# Patient Record
Sex: MALE | Race: BLACK OR AFRICAN AMERICAN | NOT HISPANIC OR LATINO | Employment: FULL TIME | URBAN - METROPOLITAN AREA
[De-identification: names, ages, dates, MRNs, and addresses within clinical notes are randomized per-mention and may not be internally consistent; named-entity substitution may affect disease eponyms.]

---

## 2018-08-09 ENCOUNTER — HOSPITAL ENCOUNTER (EMERGENCY)
Facility: HOSPITAL | Age: 53
Discharge: HOME | End: 2018-08-09
Attending: EMERGENCY MEDICINE | Admitting: EMERGENCY MEDICINE
Payer: MEDICAID

## 2018-08-09 VITALS
TEMPERATURE: 97.7 F | OXYGEN SATURATION: 98 % | WEIGHT: 210 LBS | RESPIRATION RATE: 19 BRPM | SYSTOLIC BLOOD PRESSURE: 163 MMHG | HEART RATE: 66 BPM | BODY MASS INDEX: 25.57 KG/M2 | HEIGHT: 76 IN | DIASTOLIC BLOOD PRESSURE: 110 MMHG

## 2018-08-09 DIAGNOSIS — Z20.2 STD EXPOSURE: Primary | ICD-10-CM

## 2018-08-09 LAB
BACTERIA UR QL AUTO: ABNORMAL /HPF
BILIRUB UR QL STRIP: NEGATIVE
CLARITY UR: CLEAR
CLARITY, POC: CLEAR
COLOR UR: YELLOW
COLOR, POC: YELLOW
GLUCOSE UR STRIP-MCNC: NEGATIVE MG/DL
HGB UR QL STRIP.AUTO: ABNORMAL
HIV 1+2 AB+HIV1 P24 AG SERPL QL IA: NORMAL
HIV1 P24 AG SER QL: NORMAL
HYALINE CASTS #/AREA URNS LPF: ABNORMAL /LPF
KETONES UR STRIP-MCNC: NEGATIVE MG/DL
LEUKOCYTE ESTERASE UR QL STRIP: ABNORMAL
NITRITE UR QL STRIP: NEGATIVE
NON-SQ EPI CELLS URNS QL MICRO: ABNORMAL /HPF
PH UR STRIP.AUTO: 6.5 [PH] (ref 4.5–8)
PROT UR STRIP-MCNC: NEGATIVE MG/DL
RBC #/AREA URNS AUTO: ABNORMAL /HPF
SP GR UR STRIP.AUTO: >=1.03 (ref 1–1.03)
UROBILINOGEN UR QL STRIP.AUTO: 0.2 E.U./DL
WBC #/AREA URNS AUTO: ABNORMAL /HPF

## 2018-08-09 PROCEDURE — 96372 THER/PROPH/DIAG INJ SC/IM: CPT

## 2018-08-09 PROCEDURE — 87591 N.GONORRHOEAE DNA AMP PROB: CPT | Performed by: EMERGENCY MEDICINE

## 2018-08-09 PROCEDURE — 99283 EMERGENCY DEPT VISIT LOW MDM: CPT

## 2018-08-09 PROCEDURE — 87806 HIV AG W/HIV1&2 ANTB W/OPTIC: CPT | Performed by: EMERGENCY MEDICINE

## 2018-08-09 PROCEDURE — 87086 URINE CULTURE/COLONY COUNT: CPT

## 2018-08-09 PROCEDURE — 81001 URINALYSIS AUTO W/SCOPE: CPT

## 2018-08-09 PROCEDURE — 36415 COLL VENOUS BLD VENIPUNCTURE: CPT | Performed by: EMERGENCY MEDICINE

## 2018-08-09 PROCEDURE — 87491 CHLMYD TRACH DNA AMP PROBE: CPT | Performed by: EMERGENCY MEDICINE

## 2018-08-09 RX ORDER — AZITHROMYCIN 250 MG/1
1000 TABLET, FILM COATED ORAL ONCE
Status: COMPLETED | OUTPATIENT
Start: 2018-08-09 | End: 2018-08-09

## 2018-08-09 RX ADMIN — LIDOCAINE HYDROCHLORIDE 250 MG: 10 INJECTION, SOLUTION EPIDURAL; INFILTRATION; INTRACAUDAL; PERINEURAL at 05:08

## 2018-08-09 RX ADMIN — AZITHROMYCIN MONOHYDRATE 1000 MG: 250 TABLET ORAL at 05:07

## 2018-08-09 NOTE — ED ATTENDING ATTESTATION
Cecilia Mclaughlin MD, saw and evaluated the patient  I have discussed the patient with the resident/non-physician practitioner and agree with the resident's/non-physician practitioner's findings, Plan of Care, and MDM as documented in the resident's/non-physician practitioner's note, except where noted  All available labs and Radiology studies were reviewed  At this point I agree with the current assessment done in the Emergency Department  I have conducted an independent evaluation of this patient including a focused history of:    Emergency Department Note- Umberto Gr 45 y o  male MRN: [id number]    Unit/Bed#: ED 12 Encounter: [id number]    Umberto Gr is a 45 y o  male who presents with   Chief Complaint   Patient presents with    Exposure to STD     Pt  reports "I was engaging in sexual intercourse, and the condom broke, and I think she gave me Ladneha Toyers  I also want to get tested for HIV "           History of Present Illness   HPI:  Umberto Gr is a 45 y o  male who presents for evaluation of:  STD exposure after sexual intercourse  Patient is requesting STD and HIV screening  The patient believes that he may have contracted chlamydia from the sexual intercourse  Review of Systems   Constitutional: Negative for fatigue and fever  Genitourinary: Negative for discharge and penile swelling  All other systems reviewed and are negative  Historical Information   Past Medical History:   Diagnosis Date    Asthma     Hypertension      History reviewed  No pertinent surgical history    Social History   History   Alcohol Use No     History   Drug Use No     History   Smoking Status    Current Every Day Smoker    Packs/day: 1 00    Types: Cigarettes   Smokeless Tobacco    Never Used     Family History: non-contributory    Meds/Allergies   all medications and allergies reviewed  No Known Allergies    Objective   First Vitals:   Blood Pressure: (!) 163/110 (08/09/18 0418)  Pulse: 66 (18)  Temperature: 97 7 °F (36 5 °C) (18)  Temp Source: Oral (18)  Respirations: 19 (18)  Height: 6' 4" (193 cm) (18)  Weight - Scale: 95 3 kg (210 lb) (18)  SpO2: 98 % (18)    Current Vitals:   Blood Pressure: (!) 163/110 (18)  Pulse: 66 (18)  Temperature: 97 7 °F (36 5 °C) (18)  Temp Source: Oral (18)  Respirations: 19 (18)  Height: 6' 4" (193 cm) (18)  Weight - Scale: 95 3 kg (210 lb) (18)  SpO2: 98 % (18)    No intake or output data in the 24 hours ending 18 1500    Invasive Devices          No matching active lines, drains, or airways          Physical Exam   Constitutional: He is oriented to person, place, and time  He appears well-developed and well-nourished  HENT:   Head: Normocephalic and atraumatic  Genitourinary: Penis normal  No penile tenderness  Musculoskeletal: Normal range of motion  He exhibits no deformity  Neurological: He is alert and oriented to person, place, and time  Skin: Skin is warm and dry  Psychiatric: He has a normal mood and affect  His behavior is normal  Judgment and thought content normal    Nursing note and vitals reviewed  Medical Decision Makin  STD exposure:  Plan to screen the patient for chlamydia, gonorrhea, and HIV      Recent Results (from the past 36 hour(s))   POCT urinalysis dipstick    Collection Time: 18  5:02 AM   Result Value Ref Range    Color, UA yellow     Clarity, UA clear    Rapid HIV 1/2 AB-AG Combo    Collection Time: 18  5:09 AM   Result Value Ref Range    Rapid HIV 1 AND 2 Non-Reactive Non-Reactive    HIV-1 P24 Ag Screen Non-Reactive Non-Reactive   ED Urine Macroscopic    Collection Time: 18  5:13 AM   Result Value Ref Range    Color, UA Yellow     Clarity, UA Clear     pH, UA 6 5 4 5 - 8 0    Leukocytes, UA Small (A) Negative    Nitrite, UA Negative Negative    Protein, UA Negative Negative mg/dl    Glucose, UA Negative Negative mg/dl    Ketones, UA Negative Negative mg/dl    Urobilinogen, UA 0 2 0 2, 1 0 E U /dl E U /dl    Bilirubin, UA Negative Negative    Blood, UA Small (A) Negative    Specific Gravity, UA >=1 030 1 003 - 1 030   Urine Microscopic    Collection Time: 08/09/18  5:13 AM   Result Value Ref Range    RBC, UA 4-10 (A) None Seen, 0-5 /hpf    WBC, UA 30-50 (A) None Seen, 0-5, 5-55, 5-65 /hpf    Epithelial Cells None Seen None Seen, Occasional /hpf    Bacteria, UA None Seen None Seen, Occasional /hpf    Hyaline Casts, UA 3-5 (A) None Seen /lpf     No orders to display         Portions of the record may have been created with voice recognition software  Occasional wrong word or "sound a like" substitutions may have occurred due to the inherent limitations of voice recognition software  Read the chart carefully and recognize, using context, where substitutions have occurred

## 2018-08-09 NOTE — DISCHARGE INSTRUCTIONS
Sexually Transmitted Diseases   WHAT YOU NEED TO KNOW:   A sexually transmitted disease (STD), is also called a sexually transmitted infection (STI)  An STD is an infection caused by bacteria or a virus  STDs are spread by oral, genital, or anal sex  Some examples of STDs are HIV, chlamydia, syphilis, and gonorrhea  DISCHARGE INSTRUCTIONS:   Return to the emergency department if:   · You have genital swelling or pain, or unusual bleeding  · You have joint pain, rash, swollen lymph nodes, or night sweats  · You have severe abdominal pain  Contact your healthcare provider if:   · You have a fever  · Your symptoms do not go away or they get worse, even after treatment  · You have bleeding or pain during sex  · You have questions or concerns about your condition or care  Medicines:   · Medicines  help treat the infection  · Take your medicine as directed  Contact your healthcare provider if you think your medicine is not helping or if you have side effects  Tell him of her if you are allergic to any medicine  Keep a list of the medicines, vitamins, and herbs you take  Include the amounts, and when and why you take them  Bring the list or the pill bottles to follow-up visits  Carry your medicine list with you in case of an emergency  Prevent the spread of an STD:  Ask your healthcare provider for more information about the following safe sex practices:  · Use condoms  Use a latex condom if you have oral, genital, or anal sex  Use a new condom each time  Use a polyurethane condom if you are allergic to latex  · Do not douche  Douching upsets the normal balance of bacteria are found in your vagina  It does not prevent or clear up vaginal infections  · Do not have sex with someone who has an STD  This includes oral and anal sex  · Limit sexual partners  Have sex with one person who is not having sex with anyone else  · Do not have sex during treatment    Do not have sex while you or your partners are being treated for an STD  · Get screening tests regularly  if you are sexually active  · Get vaccinated  Vaccines may help to prevent your risk of some STDs  Ask your healthcare provider for more information about vaccines for STDs  Follow up with your healthcare provider as directed:  Write down your questions so you remember to ask them during your visits  © 2017 [employer] Information is for End User's use only and may not be sold, redistributed or otherwise used for commercial purposes  All illustrations and images included in CareNotes® are the copyrighted property of [employer]  or [employer]  The above information is an  only  It is not intended as medical advice for individual conditions or treatments  Talk to your doctor, nurse or pharmacist before following any medical regimen to see if it is safe and effective for you

## 2018-08-09 NOTE — ED PROVIDER NOTES
History  Chief Complaint   Patient presents with    Exposure to STD     Pt  reports "I was engaging in sexual intercourse, and the condom broke, and I think she gave me Cloteal Raw  I also want to get tested for HIV "         29-year-old male presents to the emergency room for penile discharge and dysuria x1 day  Patient states that he had sexual intercourse with a women that he does not know very well 2 days ago  States that the condom broke and that today he developed white penile discharge and intermittent dysuria  He denies any rashes or penile lesions, testicular pain/ swelling, abdominal pain, nausea / vomiting, fevers/chills, chest pain, or shortness of breath  He states that he has history of Chlamydia the past and states that symptoms are similar  He also states that he would like to be tested for HIV as well  History provided by:  Patient  Exposure to STD   Location:  Sexual intercourse   Quality:  Penile discharge  Severity:  Moderate  Onset quality:  Sudden  Duration:  1 day  Timing:  Constant  Progression:  Unchanged  Chronicity:  New  Context:  Sex with a women he doesnt know well and condom broke   Relieved by:  None tried   Worsened by:  Nothing   Ineffective treatments:  None tried   Associated symptoms: no abdominal pain, no chest pain, no congestion, no cough, no diarrhea, no ear pain, no fever, no headaches, no nausea, no rash, no shortness of breath, no sore throat, no vomiting and no wheezing    Risk factors:  Risky sexual behavior       None       Past Medical History:   Diagnosis Date    Asthma     Hypertension        History reviewed  No pertinent surgical history  History reviewed  No pertinent family history  I have reviewed and agree with the history as documented      Social History   Substance Use Topics    Smoking status: Current Every Day Smoker     Packs/day: 1 00     Types: Cigarettes    Smokeless tobacco: Never Used    Alcohol use No        Review of Systems Constitutional: Negative for chills and fever  HENT: Negative for congestion, ear pain and sore throat  Eyes: Negative for pain and visual disturbance  Respiratory: Negative for cough, shortness of breath and wheezing  Cardiovascular: Negative for chest pain and leg swelling  Gastrointestinal: Negative for abdominal pain, diarrhea, nausea and vomiting  Genitourinary: Positive for discharge and dysuria  Negative for frequency, hematuria, penile pain, penile swelling, scrotal swelling, testicular pain and urgency  Musculoskeletal: Negative for neck pain and neck stiffness  Skin: Negative for rash and wound  Neurological: Negative for weakness, numbness and headaches  Psychiatric/Behavioral: Negative for agitation and confusion  All other systems reviewed and are negative  Physical Exam  ED Triage Vitals [08/09/18 0418]   Temperature Pulse Respirations Blood Pressure SpO2   97 7 °F (36 5 °C) 66 19 (!) 163/110 98 %      Temp Source Heart Rate Source Patient Position - Orthostatic VS BP Location FiO2 (%)   Oral Monitor Sitting Left arm --      Pain Score       1           Orthostatic Vital Signs  Vitals:    08/09/18 0418   BP: (!) 163/110   Pulse: 66   Patient Position - Orthostatic VS: Sitting       Physical Exam   Constitutional: He is oriented to person, place, and time  He appears well-developed and well-nourished  HENT:   Head: Normocephalic and atraumatic  Mouth/Throat: Oropharynx is clear and moist    Eyes: EOM are normal  Pupils are equal, round, and reactive to light  Neck: Normal range of motion  Neck supple  Cardiovascular: Normal rate and regular rhythm  Pulmonary/Chest: Effort normal and breath sounds normal  No respiratory distress  He has no wheezes  He has no rales  Abdominal: Soft  Bowel sounds are normal  He exhibits no distension  There is no tenderness  Genitourinary: Testes normal and penis normal  Circumcised  No discharge found     Musculoskeletal: Normal range of motion  Neurological: He is alert and oriented to person, place, and time  No focal deficits   Skin: Skin is warm and dry  Nursing note and vitals reviewed  ED Medications  Medications   cefTRIAXone (ROCEPHIN) 250 mg in lidocaine (PF) (XYLOCAINE-MPF) 1 % IM only syringe (250 mg Intramuscular Given 8/9/18 0508)   azithromycin (ZITHROMAX) tablet 1,000 mg (1,000 mg Oral Given 8/9/18 0507)       Diagnostic Studies  Results Reviewed     Procedure Component Value Units Date/Time    Rapid HIV 1/2 AB-AG Combo [[id number]]  (Normal) Collected:  08/09/18 0509    Lab Status:  Final result Specimen:  Blood from Arm, Left Updated:  08/09/18 0546     Rapid HIV 1 AND 2 Non-Reactive     HIV-1 P24 Ag Screen Non-Reactive    Narrative:         Negative for HIV-1 p24 Antigen  Negative for HIV-1 and/or HIV-2 Antibody  Urine Microscopic [[id number]]  (Abnormal) Collected:  08/09/18 0513    Lab Status:  Final result Specimen:  Urine from Urine, Clean Catch Updated:  08/09/18 0522     RBC, UA 4-10 (A) /hpf      WBC, UA 30-50 (A) /hpf      Epithelial Cells None Seen /hpf      Bacteria, UA None Seen /hpf      Hyaline Casts, UA 3-5 (A) /lpf     Urine culture [[id number]] Collected:  08/09/18 0513    Lab Status: In process Specimen:  Urine from Urine, Clean Catch Updated:  08/09/18 0522    Chlamydia/GC amplified DNA by PCR [[id number]] Collected:  08/09/18 0506    Lab Status:   In process Specimen:  Urine from Urine, Other Updated:  08/09/18 0514    POCT urinalysis dipstick [[id number]]  (Normal) Resulted:  08/09/18 0502    Lab Status:  Final result Specimen:  Urine Updated:  08/09/18 0510     Color, UA yellow     Clarity, UA clear    ED Urine Macroscopic [[id number]]  (Abnormal) Collected:  08/09/18 0513    Lab Status:  Final result Specimen:  Urine Updated:  08/09/18 0502     Color, UA Yellow     Clarity, UA Clear     pH, UA 6 5     Leukocytes, UA Small (A)     Nitrite, UA Negative     Protein, UA Negative mg/dl Glucose, UA Negative mg/dl      Ketones, UA Negative mg/dl      Urobilinogen, UA 0 2 E U /dl      Bilirubin, UA Negative     Blood, UA Small (A)     Specific Gravity, UA >=1 030    Narrative:       CLINITEK RESULT                 No orders to display         Procedures  Procedures      Phone Consults  ED Phone Contact    ED Course                               MDM  Number of Diagnoses or Management Options  STD exposure: new and requires workup  Diagnosis management comments:   Patient with STD exposure  Will get UA, GC urine, rapid HIV, and treat for gonorrhea /chlamydia  Patient states that he does not want wait for the rapid HIV test   He was instructed to call this morning for results  Patient reevaluated and feels improved  Patient updated on results of tests  Discharge instructions given including follow-up, and return precautions  Patient demonstrates verbal understanding and agrees with plan         Amount and/or Complexity of Data Reviewed  Clinical lab tests: ordered and reviewed  Tests in the radiology section of CPT®: ordered and reviewed  Tests in the medicine section of CPT®: ordered and reviewed  Discussion of test results with the performing providers: yes  Decide to obtain previous medical records or to obtain history from someone other than the patient: yes  Obtain history from someone other than the patient: yes  Review and summarize past medical records: yes  Discuss the patient with other providers: yes  Independent visualization of images, tracings, or specimens: yes    Patient Progress  Patient progress: improved    CritCare Time    Disposition  Final diagnoses:   STD exposure     Time reflects when diagnosis was documented in both MDM as applicable and the Disposition within this note     Time User Action Codes Description Comment    8/9/2018  5:37 AM Hamzah De Leon Add [Z20 2] STD exposure       ED Disposition     ED Disposition Condition Comment    Discharge  Aletha Casey discharge to home/self care  Condition at discharge: Good        Follow-up Information     Follow up With Specialties Details Why Contact Info Additional [address] in 1 day for follow up  [facility], [address]  [phone] [facility] Emergency Department Emergency Medicine Go to immediately for any new or worsening symptoms  [address]  [phone]  ED, [address] [place], [place], [address]          There are no discharge medications for this patient  No discharge procedures on file  ED Provider  Attending physically available and evaluated Mack Farias I managed the patient along with the ED Attending      Electronically Signed by         Jossue Cisse DO  08/09/18 6552

## 2018-08-10 LAB — BACTERIA UR CULT: NORMAL

## 2018-08-10 NOTE — PROGRESS NOTES
Informed patient of positive result  Was treated in the ED for gonorrhea  Advised to abstain for intercourse for at least 2 weeks, get rechecked, and to treat partners  All questions answered

## 2018-08-17 LAB
CHLAMYDIA DNA CVX QL NAA+PROBE: ABNORMAL
N GONORRHOEA DNA GENITAL QL NAA+PROBE: ABNORMAL